# Patient Record
Sex: MALE | Race: BLACK OR AFRICAN AMERICAN | NOT HISPANIC OR LATINO | Employment: FULL TIME | URBAN - METROPOLITAN AREA
[De-identification: names, ages, dates, MRNs, and addresses within clinical notes are randomized per-mention and may not be internally consistent; named-entity substitution may affect disease eponyms.]

---

## 2024-11-13 ENCOUNTER — OFFICE VISIT (OUTPATIENT)
Dept: URGENT CARE | Facility: CLINIC | Age: 24
End: 2024-11-13
Payer: COMMERCIAL

## 2024-11-13 ENCOUNTER — HOSPITAL ENCOUNTER (OUTPATIENT)
Dept: RADIOLOGY | Facility: HOSPITAL | Age: 24
Discharge: HOME | End: 2024-11-13
Attending: NURSE PRACTITIONER
Payer: COMMERCIAL

## 2024-11-13 VITALS
HEART RATE: 67 BPM | TEMPERATURE: 98 F | WEIGHT: 156 LBS | HEIGHT: 69 IN | RESPIRATION RATE: 18 BRPM | SYSTOLIC BLOOD PRESSURE: 116 MMHG | DIASTOLIC BLOOD PRESSURE: 76 MMHG | BODY MASS INDEX: 23.11 KG/M2 | OXYGEN SATURATION: 100 %

## 2024-11-13 DIAGNOSIS — M25.571 ACUTE RIGHT ANKLE PAIN: Primary | ICD-10-CM

## 2024-11-13 DIAGNOSIS — M25.571 ACUTE RIGHT ANKLE PAIN: ICD-10-CM

## 2024-11-13 DIAGNOSIS — S93.401A SPRAIN OF RIGHT ANKLE, UNSPECIFIED LIGAMENT, INITIAL ENCOUNTER: ICD-10-CM

## 2024-11-13 PROCEDURE — 73610 X-RAY EXAM OF ANKLE: CPT | Mod: TC,RT

## 2024-11-13 PROCEDURE — 99203 OFFICE O/P NEW LOW 30 MIN: CPT | Mod: S$PBB,,, | Performed by: NURSE PRACTITIONER

## 2024-11-13 PROCEDURE — 99215 OFFICE O/P EST HI 40 MIN: CPT | Mod: PBBFAC,25 | Performed by: NURSE PRACTITIONER

## 2024-11-13 RX ORDER — IBUPROFEN 800 MG/1
800 TABLET ORAL 3 TIMES DAILY
Qty: 15 TABLET | Refills: 0 | Status: SHIPPED | OUTPATIENT
Start: 2024-11-13 | End: 2024-11-18

## 2024-11-13 NOTE — PATIENT INSTRUCTIONS
Please follow instructions on patient education material.      Return to urgent care in 2 to 3 days if symptoms are not improving, immediately if you develop any new or worsening symptoms.   For ankle sprain you were placed in a lace-up  figure- of -eight- ankle support.  RICE:  Rest, ICE, Compression, Elevation  If you see color change in your toes (blue, black, purple, white) go to ER.  If sudden severe pain in ankle, or splint feels too tight, go to ER.    Tylenol or Motrin for pain.     Take Rx medications as prescribed and use only when needed, not on a schedule.    Although there is no fracture you need to follow up with a PCP or Orthopedic Specialist If pain persist    Use good body mechanics when working/lifting. Stretch your abdominals, hamstrings, buttocks, and thighs frequently. Drink plenty of water.

## 2024-11-13 NOTE — PROGRESS NOTES
"Subjective:      Patient ID: Gilberto Foote is a 24 y.o. male.    Vitals:  height is 5' 9" (1.753 m) and weight is 70.8 kg (156 lb). His temperature is 98 °F (36.7 °C). His blood pressure is 116/76 and his pulse is 67. His respiration is 18 and oxygen saturation is 100%.     Chief Complaint: Ankle Pain (Pt c/o Rt ankle pain& swelling  after falling on another player during basketball game on Friday/Tylenol taken for symptom relief , icing and elevating /Pt states he has had trauma to ankle before )    Ankle Pain   Incident onset: 6 days. Incident location: Riskified basketball game, was able to walk after injury. The injury mechanism is unknown (Player fell on right ankle). The pain is present in the right ankle (medial). The quality of the pain is described as shooting and burning. The pain is at a severity of 5/10. The pain is moderate. Pertinent negatives include no loss of motion, loss of sensation, numbness or tingling. He reports no foreign bodies present. The symptoms are aggravated by weight bearing. He has tried ice and acetaminophen for the symptoms. The treatment provided no relief.        Neurological:  Negative for numbness.      Objective:     Physical Exam   Constitutional: He is oriented to person, place, and time. He appears well-developed. He is cooperative.  Non-toxic appearance. He does not appear ill. No distress. awake  HENT:   Head: Atraumatic.   Nose: No purulent discharge. Right sinus exhibits no maxillary sinus tenderness and no frontal sinus tenderness. Left sinus exhibits no maxillary sinus tenderness and no frontal sinus tenderness.   Mouth/Throat: Uvula is midline.   Eyes: Right eye exhibits no discharge. Left eye exhibits no discharge. Extraocular movement intact   Neck: Neck supple. No neck rigidity present.   Cardiovascular: Regular rhythm.   Pulmonary/Chest: Effort normal and breath sounds normal. No respiratory distress. He has no wheezes. He has no rhonchi. He has no rales. "   Abdominal: Normal appearance.   Musculoskeletal:      Left ankle: He exhibits normal range of motion, no swelling, no ecchymosis, no deformity and normal pulse. Tenderness. Medial malleolus tenderness found. No lateral malleolus tenderness found. Achilles tendon normal. Achilles tendon exhibits normal Judge's test results.      Comments: No midfoot pain, + strong pedal pulse, no discoloration or pain at base of the 5th metatarsal or navicular area   Lymphadenopathy:     He has no cervical adenopathy.   Neurological: He is alert and oriented to person, place, and time.   Skin: Skin is warm, dry and not diaphoretic. Capillary refill takes less than 2 seconds.   Psychiatric: His behavior is normal. Mood, judgment and thought content normal.   Nursing note and vitals reviewed.      Assessment:     1. Acute right ankle pain    2. Sprain of right ankle, unspecified ligament, initial encounter    XR ANKLE COMPLETE 3 VIEW RIGHT    Result Date: 11/13/2024  EXAMINATION: XR ANKLE COMPLETE 3 VIEW RIGHT CLINICAL HISTORY: Medial ankle pain after fall;Pain in right ankle and joints of right foot TECHNIQUE: Three views COMPARISON: None available. FINDINGS: Osseous and articular surfaces are unremarkable.  No acute fracture or dislocation identified.  Unremarkable mineralization of the bones.  There is no soft tissue calcification.     No acute osseous abnormality identified. Electronically signed by: Jozef Ortega Date:    11/13/2024 Time:    16:38       Plan:   ER precautions given and discussed  Placed in a lace-up  figure- of -eight- ankle support.  RICE:  Rest, ICE, Compression, Elevation  If you see color change in your toes (blue, black, purple, white) go to ER.  If sudden severe pain in ankle, or splint feels too tight, go to ER.  Acute right ankle pain  -     XR ANKLE COMPLETE 3 VIEW RIGHT; Future; Expected date: 11/13/2024  -     ibuprofen (ADVIL,MOTRIN) 800 MG tablet; Take 1 tablet (800 mg total) by mouth 3 (three)  times daily. for 5 days  Dispense: 15 tablet; Refill: 0    Sprain of right ankle, unspecified ligament, initial encounter  -     ibuprofen (ADVIL,MOTRIN) 800 MG tablet; Take 1 tablet (800 mg total) by mouth 3 (three) times daily. for 5 days  Dispense: 15 tablet; Refill: 0

## 2024-11-13 NOTE — LETTER
November 13, 2024      [facility]  [address] [place] [id number]  Phone: [phone]       Patient: Gilberto Foote   YOB: 2000  Date of Visit: 11/13/2024    To Whom It May Concern:    Chyna Foote  was at [facility] on 11/13/2024. The patient may return to work/school on 11/15/24 with no restrictions. If you have any questions or concerns, or if I can be of further assistance, please do not hesitate to contact me.    Sincerely,    SADIE Carmona

## 2025-02-19 ENCOUNTER — OFFICE VISIT (OUTPATIENT)
Dept: URGENT CARE | Facility: CLINIC | Age: 25
End: 2025-02-19
Payer: COMMERCIAL

## 2025-02-19 VITALS
TEMPERATURE: 99 F | HEIGHT: 69 IN | WEIGHT: 144.38 LBS | DIASTOLIC BLOOD PRESSURE: 83 MMHG | BODY MASS INDEX: 21.38 KG/M2 | SYSTOLIC BLOOD PRESSURE: 121 MMHG | OXYGEN SATURATION: 98 % | HEART RATE: 76 BPM | RESPIRATION RATE: 18 BRPM

## 2025-02-19 DIAGNOSIS — Z20.2 EXPOSURE TO GONORRHEA: ICD-10-CM

## 2025-02-19 DIAGNOSIS — R36.9 PENILE DISCHARGE: Primary | ICD-10-CM

## 2025-02-19 PROCEDURE — 63600175 PHARM REV CODE 636 W HCPCS

## 2025-02-19 PROCEDURE — 99214 OFFICE O/P EST MOD 30 MIN: CPT | Mod: PBBFAC | Performed by: NURSE PRACTITIONER

## 2025-02-19 PROCEDURE — 87661 TRICHOMONAS VAGINALIS AMPLIF: CPT | Performed by: NURSE PRACTITIONER

## 2025-02-19 RX ORDER — CEFTRIAXONE 500 MG/1
500 INJECTION, POWDER, FOR SOLUTION INTRAMUSCULAR; INTRAVENOUS
Status: COMPLETED | OUTPATIENT
Start: 2025-02-19 | End: 2025-02-19

## 2025-02-19 RX ORDER — LIDOCAINE HYDROCHLORIDE 10 MG/ML
1.8 INJECTION, SOLUTION EPIDURAL; INFILTRATION; INTRACAUDAL; PERINEURAL
Status: COMPLETED | OUTPATIENT
Start: 2025-02-19 | End: 2025-02-19

## 2025-02-19 RX ADMIN — LIDOCAINE HYDROCHLORIDE 18 MG: 10 INJECTION, SOLUTION EPIDURAL; INFILTRATION; INTRACAUDAL; PERINEURAL at 02:02

## 2025-02-19 RX ADMIN — CEFTRIAXONE SODIUM 500 MG: 500 INJECTION, POWDER, FOR SOLUTION INTRAMUSCULAR; INTRAVENOUS at 02:02

## 2025-02-19 NOTE — PATIENT INSTRUCTIONS
Please follow instructions on patient education material.      Return to urgent care in 2 to 3 days if symptoms are not improving, immediately if you develop any new or worsening symptoms.     - abstain from sex until all results are known  - urine tests take up to 7 days to result  - this clinic will ONLY call you for POSITIVE = ABNORMAL results.   We will not call you to tell you tests are NEGATIVE = NORMAL.    - if you need medication to treat any conditions, it was either prescribed to you today and sent to your pharmacy, or it will be sent when providers view abnormal results.  - if any of your tests are abnormal, we will call you with a plan of care.  - always require condoms  - partners will need treatment if  any +STDs on your testing. They have to have their own visit, we do not automatically treat them.     - If your testing were to come back + gonorrhea infections - you are being treated for that with the antibiotic injection No additional treatment for gonorrhea infection would be needed.

## 2025-02-19 NOTE — PROGRESS NOTES
"Subjective:      Patient ID: Gilberto Foote is a 24 y.o. male.    Vitals:  height is 5' 9" (1.753 m) and weight is 65.5 kg (144 lb 6.4 oz). His temperature is 98.6 °F (37 °C). His blood pressure is 121/83 and his pulse is 76. His respiration is 18 and oxygen saturation is 98%.     Chief Complaint: Exposure to STD (Patient states that he was exposed to a STD, partner is waiting for her results /Discharge in penis and burning when urinating x 2 days )    Exposure to STD     as stated in chief complaint.  Patient states he has sex with female partner who expose him to gonorrhea 1 week ago.  Patient reports penile discharge is yellow in color was noticed Monday 3 days ago.  Patient denies fever, stomach pain, penile rash or pain, nausea or vomiting, dysuria.  ROS   Objective:     Physical Exam   Constitutional: He appears well-developed.  Non-toxic appearance. He does not appear ill. No distress.   HENT:   Head: Atraumatic.   Nose: No purulent discharge. Right sinus exhibits no maxillary sinus tenderness and no frontal sinus tenderness. Left sinus exhibits no maxillary sinus tenderness and no frontal sinus tenderness.   Mouth/Throat: Uvula is midline.   Eyes: Right eye exhibits no discharge. Left eye exhibits no discharge. Extraocular movement intact   Neck: Neck supple. No neck rigidity present.   Cardiovascular: Regular rhythm.   Pulmonary/Chest: Effort normal and breath sounds normal. No respiratory distress. He has no wheezes. He has no rhonchi. He has no rales.   Genitourinary:         Comments: Deferred patient declined.     Lymphadenopathy:     He has no cervical adenopathy.   Neurological: He is alert.   Skin: Skin is warm, dry and not diaphoretic.   Psychiatric: His behavior is normal. Mood, judgment and thought content normal.   Nursing note and vitals reviewed.      Assessment:     1. Penile discharge    2. Exposure to gonorrhea        Plan:   ER precautions given and discussed   IM injection given for " penile discharge  abstain from sex until all results are known  - urine tests take up to 7 days to result  - this clinic will ONLY call you for POSITIVE = ABNORMAL results.   We will not call you to tell you tests are NEGATIVE = NORMAL.  Penile discharge  -     Sexually-Transmitted Infections (STIs) Increased Risk Panel    Exposure to gonorrhea  -     Sexually-Transmitted Infections (STIs) Increased Risk Panel    Other orders  -     cefTRIAXone injection 500 mg  -     LIDOcaine (PF) 10 mg/ml (1%) injection 18 mg

## 2025-02-20 ENCOUNTER — TELEPHONE (OUTPATIENT)
Dept: URGENT CARE | Facility: CLINIC | Age: 25
End: 2025-02-20
Payer: COMMERCIAL

## 2025-02-20 ENCOUNTER — RESULTS FOLLOW-UP (OUTPATIENT)
Dept: URGENT CARE | Facility: CLINIC | Age: 25
End: 2025-02-20
Payer: COMMERCIAL

## 2025-02-20 LAB
C TRACH RRNA UR QL NAA+PROBE: NOT DETECTED
N GONORRHOEA DNA UR QL NAA+PROBE: DETECTED
T VAGINALIS RRNA UR QL NAA+PROBE: NOT DETECTED

## 2025-02-20 NOTE — TELEPHONE ENCOUNTER
----- Message from DOREEN Downs sent at 2/20/2025  2:42 PM CST -----  Please notify Brockprimofrancia he has tested positive Gonorrhea, treatment addressed in clinci , no further treatment recommended. Please notify partners within last 3 month to seek screening. Adhere from   sex least 1 week post treatment.  ----- Message -----  From: Lab, Background User  Sent: 2/20/2025   7:14 AM CST  To: [facility] Providers